# Patient Record
Sex: FEMALE | Race: BLACK OR AFRICAN AMERICAN | NOT HISPANIC OR LATINO
[De-identification: names, ages, dates, MRNs, and addresses within clinical notes are randomized per-mention and may not be internally consistent; named-entity substitution may affect disease eponyms.]

---

## 2017-01-30 ENCOUNTER — LABORATORY RESULT (OUTPATIENT)
Age: 32
End: 2017-01-30

## 2017-01-30 ENCOUNTER — APPOINTMENT (OUTPATIENT)
Dept: ENDOCRINOLOGY | Facility: CLINIC | Age: 32
End: 2017-01-30

## 2017-01-30 VITALS
WEIGHT: 195 LBS | HEIGHT: 66 IN | HEART RATE: 75 BPM | SYSTOLIC BLOOD PRESSURE: 122 MMHG | BODY MASS INDEX: 31.34 KG/M2 | OXYGEN SATURATION: 98 % | DIASTOLIC BLOOD PRESSURE: 70 MMHG

## 2017-01-31 LAB
25(OH)D3 SERPL-MCNC: 27.6 NG/ML
ALBUMIN SERPL ELPH-MCNC: 4.1 G/DL
ALP BLD-CCNC: 70 U/L
ALT SERPL-CCNC: 13 U/L
ANION GAP SERPL CALC-SCNC: 13 MMOL/L
AST SERPL-CCNC: 21 U/L
BILIRUB SERPL-MCNC: 0.2 MG/DL
BUN SERPL-MCNC: 10 MG/DL
CALCIUM SERPL-MCNC: 9.1 MG/DL
CHLORIDE SERPL-SCNC: 102 MMOL/L
CO2 SERPL-SCNC: 25 MMOL/L
CREAT SERPL-MCNC: 0.86 MG/DL
GLUCOSE SERPL-MCNC: 112 MG/DL
POTASSIUM SERPL-SCNC: 4.2 MMOL/L
PROT SERPL-MCNC: 6.5 G/DL
SODIUM SERPL-SCNC: 140 MMOL/L
T3RU NFR SERPL: 0.98 INDEX
T4 SERPL-MCNC: 4.4 UG/DL
TSH SERPL-ACNC: 12.32 UIU/ML

## 2017-08-01 ENCOUNTER — LABORATORY RESULT (OUTPATIENT)
Age: 32
End: 2017-08-01

## 2017-08-01 ENCOUNTER — APPOINTMENT (OUTPATIENT)
Dept: ENDOCRINOLOGY | Facility: CLINIC | Age: 32
End: 2017-08-01
Payer: COMMERCIAL

## 2017-08-01 VITALS
DIASTOLIC BLOOD PRESSURE: 70 MMHG | HEART RATE: 96 BPM | WEIGHT: 195 LBS | SYSTOLIC BLOOD PRESSURE: 120 MMHG | BODY MASS INDEX: 31.34 KG/M2 | OXYGEN SATURATION: 98 % | HEIGHT: 66 IN

## 2017-08-01 PROCEDURE — 99214 OFFICE O/P EST MOD 30 MIN: CPT

## 2017-08-02 LAB
25(OH)D3 SERPL-MCNC: 17.3 NG/ML
ALBUMIN SERPL ELPH-MCNC: 4.1 G/DL
ALP BLD-CCNC: 65 U/L
ALT SERPL-CCNC: 9 U/L
ANION GAP SERPL CALC-SCNC: 16 MMOL/L
AST SERPL-CCNC: 13 U/L
BILIRUB SERPL-MCNC: 0.3 MG/DL
BUN SERPL-MCNC: 14 MG/DL
CALCIUM SERPL-MCNC: 9.3 MG/DL
CALCIUM SERPL-MCNC: 9.3 MG/DL
CHLORIDE SERPL-SCNC: 105 MMOL/L
CO2 SERPL-SCNC: 22 MMOL/L
CREAT SERPL-MCNC: 0.74 MG/DL
GLUCOSE SERPL-MCNC: 88 MG/DL
PARATHYROID HORMONE INTACT: 39 PG/ML
POTASSIUM SERPL-SCNC: 3.9 MMOL/L
PROT SERPL-MCNC: 7 G/DL
SODIUM SERPL-SCNC: 143 MMOL/L
T3RU NFR SERPL: 0.86 INDEX
T4 SERPL-MCNC: 6.1 UG/DL
TSH SERPL-ACNC: 13.61 UIU/ML

## 2018-01-11 ENCOUNTER — APPOINTMENT (OUTPATIENT)
Dept: ENDOCRINOLOGY | Facility: CLINIC | Age: 33
End: 2018-01-11

## 2018-05-09 ENCOUNTER — OUTPATIENT (OUTPATIENT)
Dept: OUTPATIENT SERVICES | Facility: HOSPITAL | Age: 33
LOS: 1 days | Discharge: STILL ADMITTED OR ADMINISTRATIVE | End: 2018-05-09

## 2018-05-10 DIAGNOSIS — E03.9 HYPOTHYROIDISM, UNSPECIFIED: ICD-10-CM

## 2018-05-10 DIAGNOSIS — F41.9 ANXIETY DISORDER, UNSPECIFIED: ICD-10-CM

## 2018-05-10 DIAGNOSIS — F32.9 MAJOR DEPRESSIVE DISORDER, SINGLE EPISODE, UNSPECIFIED: ICD-10-CM

## 2018-05-31 ENCOUNTER — APPOINTMENT (OUTPATIENT)
Dept: PSYCHIATRY | Facility: CLINIC | Age: 33
End: 2018-05-31
Payer: COMMERCIAL

## 2018-05-31 DIAGNOSIS — Z81.8 FAMILY HISTORY OF OTHER MENTAL AND BEHAVIORAL DISORDERS: ICD-10-CM

## 2018-05-31 PROCEDURE — 99205 OFFICE O/P NEW HI 60 MIN: CPT

## 2018-06-18 ENCOUNTER — RX RENEWAL (OUTPATIENT)
Age: 33
End: 2018-06-18

## 2018-06-21 ENCOUNTER — APPOINTMENT (OUTPATIENT)
Dept: PSYCHIATRY | Facility: CLINIC | Age: 33
End: 2018-06-21

## 2018-07-13 ENCOUNTER — APPOINTMENT (OUTPATIENT)
Dept: PSYCHIATRY | Facility: CLINIC | Age: 33
End: 2018-07-13
Payer: COMMERCIAL

## 2018-07-13 PROCEDURE — 99213 OFFICE O/P EST LOW 20 MIN: CPT

## 2018-10-05 ENCOUNTER — APPOINTMENT (OUTPATIENT)
Dept: PSYCHIATRY | Facility: CLINIC | Age: 33
End: 2018-10-05
Payer: COMMERCIAL

## 2018-10-05 PROCEDURE — 99213 OFFICE O/P EST LOW 20 MIN: CPT

## 2018-10-10 ENCOUNTER — RX RENEWAL (OUTPATIENT)
Age: 33
End: 2018-10-10

## 2018-11-15 ENCOUNTER — TRANSCRIPTION ENCOUNTER (OUTPATIENT)
Age: 33
End: 2018-11-15

## 2019-01-04 ENCOUNTER — APPOINTMENT (OUTPATIENT)
Dept: PSYCHIATRY | Facility: CLINIC | Age: 34
End: 2019-01-04

## 2019-01-30 ENCOUNTER — APPOINTMENT (OUTPATIENT)
Dept: PSYCHIATRY | Facility: CLINIC | Age: 34
End: 2019-01-30
Payer: COMMERCIAL

## 2019-01-30 PROCEDURE — 99213 OFFICE O/P EST LOW 20 MIN: CPT

## 2019-02-21 ENCOUNTER — LABORATORY RESULT (OUTPATIENT)
Age: 34
End: 2019-02-21

## 2019-02-21 ENCOUNTER — APPOINTMENT (OUTPATIENT)
Dept: ENDOCRINOLOGY | Facility: CLINIC | Age: 34
End: 2019-02-21
Payer: COMMERCIAL

## 2019-02-21 VITALS
SYSTOLIC BLOOD PRESSURE: 120 MMHG | BODY MASS INDEX: 32.62 KG/M2 | HEIGHT: 66 IN | DIASTOLIC BLOOD PRESSURE: 80 MMHG | HEART RATE: 100 BPM | WEIGHT: 203 LBS | OXYGEN SATURATION: 98 %

## 2019-02-21 PROCEDURE — 99214 OFFICE O/P EST MOD 30 MIN: CPT

## 2019-02-21 RX ORDER — AMOXICILLIN 500 MG/1
500 CAPSULE ORAL
Qty: 28 | Refills: 0 | Status: DISCONTINUED | COMMUNITY
Start: 2017-11-17 | End: 2019-02-21

## 2019-02-21 RX ORDER — ESCITALOPRAM OXALATE 20 MG/1
20 TABLET ORAL
Qty: 90 | Refills: 0 | Status: DISCONTINUED | COMMUNITY
Start: 2018-05-31 | End: 2019-02-21

## 2019-02-21 RX ORDER — MULTIVITAMIN
TABLET ORAL
Refills: 0 | Status: ACTIVE | COMMUNITY

## 2019-02-21 RX ORDER — ESCITALOPRAM OXALATE 5 MG/1
5 TABLET ORAL DAILY
Qty: 30 | Refills: 2 | Status: DISCONTINUED | COMMUNITY
Start: 2018-05-31 | End: 2019-02-21

## 2019-02-21 RX ORDER — FLUTICASONE PROPIONATE 50 UG/1
50 SPRAY, METERED NASAL
Qty: 16 | Refills: 0 | Status: DISCONTINUED | COMMUNITY
Start: 2017-12-04 | End: 2019-02-21

## 2019-02-21 RX ORDER — BROMPHENIRAMINE MALEATE, PSEUDOEPHEDRINE HYDROCHLORIDE, 2; 30; 10 MG/5ML; MG/5ML; MG/5ML
30-2-10 SYRUP ORAL
Qty: 300 | Refills: 0 | Status: DISCONTINUED | COMMUNITY
Start: 2017-12-04 | End: 2019-02-21

## 2019-02-21 RX ORDER — PREDNISONE 20 MG/1
20 TABLET ORAL
Qty: 6 | Refills: 0 | Status: DISCONTINUED | COMMUNITY
Start: 2017-12-04 | End: 2019-02-21

## 2019-02-21 RX ORDER — CEFDINIR 300 MG/1
300 CAPSULE ORAL
Qty: 20 | Refills: 0 | Status: DISCONTINUED | COMMUNITY
Start: 2017-11-30 | End: 2019-02-21

## 2019-02-22 LAB
ALBUMIN SERPL ELPH-MCNC: 4 G/DL
ALP BLD-CCNC: 62 U/L
ALT SERPL-CCNC: 11 U/L
ANION GAP SERPL CALC-SCNC: 12 MMOL/L
AST SERPL-CCNC: 22 U/L
BILIRUB SERPL-MCNC: <0.2 MG/DL
BUN SERPL-MCNC: 11 MG/DL
CALCIUM SERPL-MCNC: 9.6 MG/DL
CHLORIDE SERPL-SCNC: 104 MMOL/L
CO2 SERPL-SCNC: 25 MMOL/L
CREAT SERPL-MCNC: 0.76 MG/DL
GLUCOSE SERPL-MCNC: 97 MG/DL
POTASSIUM SERPL-SCNC: 4.2 MMOL/L
PROT SERPL-MCNC: 6.6 G/DL
SODIUM SERPL-SCNC: 141 MMOL/L
T3RU NFR SERPL: 0.8 TBI
T4 SERPL-MCNC: 7.8 UG/DL
TSH SERPL-ACNC: 0.21 UIU/ML

## 2019-02-22 NOTE — END OF VISIT
[FreeTextEntry3] : I, Brandee Garcia, authored this note working as a medical scribe for Dr. Crawford.  02/21/2019.  2:15PM. \par This note was authored by Brandee Garcia working as medical scribe for me. I have reviewed, edited, and revised the note as needed. I am in agreement with the exam findings, imaging findings, and treatment plan.  Rk Crawford MD

## 2019-02-22 NOTE — PROCEDURE
[FreeTextEntry1] : Thyroid ultrasound 5/31/16\par Left  13 x 10 x 15 mm lower pole nodule \par Left 8 mm mid/isthmus nodule

## 2019-02-22 NOTE — ASSESSMENT
[FreeTextEntry1] : f/u 33 year-old female with \par \par 1. Post- ablative hypothyroidism: on increased LT4 175  mcg. No sx of hyper or hypothyroidism other than non-specific fatigue. Pt states she would occ take extra LT4 to try to combat the fatigue and was advised to stop changing doses herself. Pt is more recently exercising with a . Pt is also on anxiety medication. Chemically euthyroid. Previous goiter has resolved. No local neck pain or dysphagia. \par  \par 2. H/o low vitamin D: which is common post gastric bypass surgery. Vitamin D 17 although PTH not elevated, was previously on Vit D 50k/wk. \par \par Of note, pt having irregular menses. She states it comes every month but sometimes is 5 days long and other times 2 days long. \par \par I request labs sent out today. I may suggest adding a half a pill once a week. Will discuss after seeing thyroid levels. Discussed risks of hyperthyroidism to include palpations and osteoporosis. f/u in 3 mons.

## 2019-02-22 NOTE — PHYSICAL EXAM
[Alert] : alert [No Acute Distress] : no acute distress [Well Nourished] : well nourished [Well Developed] : well developed [Normal Sclera/Conjunctiva] : normal sclera/conjunctiva [EOMI] : extra ocular movement intact [No Proptosis] : no proptosis [Normal Oropharynx] : the oropharynx was normal [No Respiratory Distress] : no respiratory distress [No Accessory Muscle Use] : no accessory muscle use [Clear to Auscultation] : lungs were clear to auscultation bilaterally [Normal Rate] : heart rate was normal  [Normal S1, S2] : normal S1 and S2 [Regular Rhythm] : with a regular rhythm [No Edema] : there was no peripheral edema [Normal Bowel Sounds] : normal bowel sounds [Not Tender] : non-tender [Soft] : abdomen soft [Not Distended] : not distended [Post Cervical Nodes] : posterior cervical nodes [Anterior Cervical Nodes] : anterior cervical nodes [Normal] : normal and non tender [No Spinal Tenderness] : no spinal tenderness [Spine Straight] : spine straight [No Stigmata of Cushings Syndrome] : no stigmata of cushings syndrome [Normal Gait] : normal gait [Normal Strength/Tone] : muscle strength and tone were normal [No Rash] : no rash [Normal Reflexes] : deep tendon reflexes were 2+ and symmetric [No Tremors] : no tremors [Oriented x3] : oriented to person, place, and time [Acanthosis Nigricans] : no acanthosis nigricans [de-identified] : no significant goiter

## 2019-02-22 NOTE — HISTORY OF PRESENT ILLNESS
[FreeTextEntry1] : f/u 33 year-old female with post-ablative hypothyroidism. \par \par Pt had been told of an elevated T4 in a previous pregnancy. Repeat numbers were normal. Repeat blood test December 2015 TSH suppressed, free T4-1 0.9. Pt noticed an increase in heart rate but no other sx of hyperthyroidism. She has no local neck pain. She has had no thyroid  eye disease. There is no fhx of thyroid disease. Pt was never exposed to RT, lithium, or amiodarone. H/o GALAN for hyperthyroidism. Initial w/u showed 35% uptake with a hyperfunctional L nodule. Pt was treated with radioactive iodine, 28 mci for 35% uptake, heterogenous, dominant L nodule.  Began LT4 100 mcg increased to 175 for TSH 12. No current sx of hypothyroidism or hyperthyroidism. No change in sx on  increased dose. \par \par Medical hx is notable for morbid obesity treated with a gastric sleeve surgery January 2014. She has approximately 90 pound weight loss since then. Of note a previous vitamin D was low at 16 which is common after bariatric surgery. On Vit D 50k/wk for Vit D 17.

## 2019-04-05 ENCOUNTER — RX RENEWAL (OUTPATIENT)
Age: 34
End: 2019-04-05

## 2019-04-12 ENCOUNTER — APPOINTMENT (OUTPATIENT)
Dept: PSYCHIATRY | Facility: CLINIC | Age: 34
End: 2019-04-12

## 2019-06-06 ENCOUNTER — APPOINTMENT (OUTPATIENT)
Dept: ENDOCRINOLOGY | Facility: CLINIC | Age: 34
End: 2019-06-06
Payer: COMMERCIAL

## 2019-06-06 VITALS
HEIGHT: 66 IN | OXYGEN SATURATION: 98 % | DIASTOLIC BLOOD PRESSURE: 76 MMHG | HEART RATE: 86 BPM | WEIGHT: 207 LBS | SYSTOLIC BLOOD PRESSURE: 120 MMHG | BODY MASS INDEX: 33.27 KG/M2

## 2019-06-06 PROCEDURE — 99214 OFFICE O/P EST MOD 30 MIN: CPT

## 2019-06-06 RX ORDER — METRONIDAZOLE 7.5 MG/G
0.75 GEL VAGINAL
Qty: 70 | Refills: 0 | Status: COMPLETED | COMMUNITY
Start: 2019-04-04

## 2019-06-06 NOTE — PAST MEDICAL HISTORY
[Excessive Bleeding] : there was excessive bleeding [Less Bleeding] : the period was lighter than normal [Regular Cycle Intervals] : have been regular

## 2019-06-07 NOTE — END OF VISIT
[FreeTextEntry3] : I, Brandee Garcia, authored this note working as a medical scribe for Dr. Crawford.  06/06/2019.  3:30PM. This note was authored by Brandee Garcia working as medical scribe for me. I have reviewed, edited, and revised the note as needed. I am in agreement with the exam findings, imaging findings, and treatment plan.  Rk Crawford MD \par

## 2019-06-07 NOTE — ASSESSMENT
[FreeTextEntry1] : f/u 33 year-old female with \par \par 1. Post- ablative hypothyroidism: on increased LT4 175  mcg. No sx of hyper or hypothyroidism other than non-specific fatigue. Pt has not been properly taking medicine; skipping once a day per week and taking with iron. This is reflected in routine labs with have elevated TSH, and in sx of fatigue. Increased risk of malabsorption of thyroid due to previous gastric bypass surgery. Advised to take T4 regularly on empty stomach. If dosage is missed, take two pills the next day. Pt is more recently exercising with a . Advised that weight loss might be more difficult if she is not on correct dosage of T4. Advised to take medicine by itself for 6 weeks on empty stomach.\par \par 2. H/o low vitamin D: which is common post gastric bypass surgery. Vitamin D 17 although PTH not elevated, was previously on Vit D 50k/wk. \par \par Get bloodwork done in 6 weeks, and f/u in 4 months.  [Levothyroxine] : The patient was instructed to take Levothyroxine on an empty stomach, separate from vitamins, and wait at least 30 minutes before eating

## 2019-06-07 NOTE — HISTORY OF PRESENT ILLNESS
[FreeTextEntry1] : f/u 33 year-old female with post-ablative hypothyroidism. \par \par Pt had been told of an elevated T4 in a previous pregnancy. Repeat numbers were normal. Repeat blood test December 2015 TSH suppressed, free T4-1 0.9. Pt noticed an increase in heart rate but no other sx of hyperthyroidism. She has no local neck pain. She has had no thyroid  eye disease. No fhx of thyroid disease. Pt was never exposed to RT, lithium, or amiodarone. H/o GALAN for hyperthyroidism. Initial w/u showed 35% uptake with a hyperfunctional L nodule. Pt was treated with GALAN, 28 mci for 35% uptake, heterogenous, dominant L nodule. Began LT4 100 mcg increased to 175 for TSH 12. Bloodwork done 5/7/2019. T4: 1.0 (normal 0.8 to 1.8). TSH 13.9.  Works at night, forgets meds once a week, but has been taking it with a multivitamin that contains iron. Pt always tired. Varied menstrual flow recently.\par \par Medical hx is notable for morbid obesity treated with a gastric sleeve surgery January 2014. She has approximately 90 pound weight loss since then. Pt states that it is difficult to lose weight. Of note a previous vitamin D was low at 16 which is common after bariatric surgery. On Vit D 50k/wk for Vit D 17.

## 2019-06-07 NOTE — PHYSICAL EXAM
[Well Nourished] : well nourished [Alert] : alert [No Acute Distress] : no acute distress [EOMI] : extra ocular movement intact [Well Developed] : well developed [Normal Sclera/Conjunctiva] : normal sclera/conjunctiva [Normal Oropharynx] : the oropharynx was normal [No Respiratory Distress] : no respiratory distress [No Proptosis] : no proptosis [No Accessory Muscle Use] : no accessory muscle use [Normal Rate] : heart rate was normal  [Clear to Auscultation] : lungs were clear to auscultation bilaterally [Normal S1, S2] : normal S1 and S2 [Regular Rhythm] : with a regular rhythm [Normal Bowel Sounds] : normal bowel sounds [No Edema] : there was no peripheral edema [Not Tender] : non-tender [Soft] : abdomen soft [Not Distended] : not distended [Post Cervical Nodes] : posterior cervical nodes [Normal] : normal and non tender [Anterior Cervical Nodes] : anterior cervical nodes [No Spinal Tenderness] : no spinal tenderness [Spine Straight] : spine straight [No Stigmata of Cushings Syndrome] : no stigmata of cushings syndrome [Normal Gait] : normal gait [Normal Strength/Tone] : muscle strength and tone were normal [No Rash] : no rash [Normal Reflexes] : deep tendon reflexes were 2+ and symmetric [Oriented x3] : oriented to person, place, and time [No Tremors] : no tremors [Acanthosis Nigricans] : no acanthosis nigricans [de-identified] : no significant goiter

## 2019-06-11 ENCOUNTER — APPOINTMENT (OUTPATIENT)
Dept: PSYCHIATRY | Facility: CLINIC | Age: 34
End: 2019-06-11
Payer: COMMERCIAL

## 2019-06-11 PROCEDURE — 99214 OFFICE O/P EST MOD 30 MIN: CPT

## 2019-09-06 ENCOUNTER — APPOINTMENT (OUTPATIENT)
Dept: PSYCHIATRY | Facility: CLINIC | Age: 34
End: 2019-09-06
Payer: COMMERCIAL

## 2019-09-06 PROCEDURE — 99214 OFFICE O/P EST MOD 30 MIN: CPT

## 2019-10-28 ENCOUNTER — APPOINTMENT (OUTPATIENT)
Dept: ENDOCRINOLOGY | Facility: CLINIC | Age: 34
End: 2019-10-28
Payer: COMMERCIAL

## 2019-10-28 VITALS
DIASTOLIC BLOOD PRESSURE: 72 MMHG | BODY MASS INDEX: 34.07 KG/M2 | HEIGHT: 66 IN | WEIGHT: 212 LBS | HEART RATE: 72 BPM | SYSTOLIC BLOOD PRESSURE: 102 MMHG | OXYGEN SATURATION: 98 %

## 2019-10-28 PROCEDURE — 99214 OFFICE O/P EST MOD 30 MIN: CPT

## 2019-10-28 RX ORDER — ESCITALOPRAM OXALATE 20 MG/1
20 TABLET ORAL DAILY
Qty: 30 | Refills: 0 | Status: DISCONTINUED | COMMUNITY
Start: 2019-01-30 | End: 2019-10-28

## 2019-10-28 NOTE — PHYSICAL EXAM
[Alert] : alert [No Acute Distress] : no acute distress [Well Nourished] : well nourished [Well Developed] : well developed [Normal Sclera/Conjunctiva] : normal sclera/conjunctiva [EOMI] : extra ocular movement intact [No Proptosis] : no proptosis [Normal Oropharynx] : the oropharynx was normal [No Respiratory Distress] : no respiratory distress [No Accessory Muscle Use] : no accessory muscle use [Clear to Auscultation] : lungs were clear to auscultation bilaterally [Normal Rate] : heart rate was normal  [Normal S1, S2] : normal S1 and S2 [Regular Rhythm] : with a regular rhythm [No Edema] : there was no peripheral edema [Normal Bowel Sounds] : normal bowel sounds [Not Tender] : non-tender [Soft] : abdomen soft [Not Distended] : not distended [Post Cervical Nodes] : posterior cervical nodes [Anterior Cervical Nodes] : anterior cervical nodes [Normal] : normal and non tender [No Spinal Tenderness] : no spinal tenderness [Spine Straight] : spine straight [No Stigmata of Cushings Syndrome] : no stigmata of cushings syndrome [Normal Gait] : normal gait [Normal Strength/Tone] : muscle strength and tone were normal [No Rash] : no rash [Normal Reflexes] : deep tendon reflexes were 2+ and symmetric [No Tremors] : no tremors [Oriented x3] : oriented to person, place, and time [Acanthosis Nigricans] : no acanthosis nigricans [de-identified] : no significant goiter

## 2019-10-28 NOTE — ASSESSMENT
[Levothyroxine] : The patient was instructed to take Levothyroxine on an empty stomach, separate from vitamins, and wait at least 30 minutes before eating [FreeTextEntry1] : F/u 33 year-old female with \par \par 1. Post-ablative hypothyroidism: on increased LT4 175 mcg. No sx of hyper or hypothyroidism other than non-specific fatigue. Pt is at increased risk of malabsorption of thyroid due to previous gastric bypass surgery. Previously advised to take T4 regularly on empty stomach. Pt has been exercising with a  but stopped recently. Pt c/o ~ 40 lbs weight gain. Thyroid US 10/2019 show a single left stable nodule with decreased in size vs 2016. Observe.\par \par 2. H/o low vitamin D: which is common post gastric bypass surgery. Vitamin D 17 although PTH not elevated, was previously on Vit D 50k/wk. \par Await lab results from primary MD\par F/u in 6 months

## 2019-10-28 NOTE — HISTORY OF PRESENT ILLNESS
[FreeTextEntry1] : F/u 33 year-old female with post-ablative hypothyroidism. \par \par Pt had been told of an elevated T4 in a previous pregnancy. Repeat labs normal. Labs 12/2015 TSH suppressed, free T4-1 0.9. Pt noticed an increase in heart rate but no other sx of hyperthyroidism. She has no local neck pain. She has had no thyroid  eye disease. No Fhx of thyroid disease. Pt was never exposed to RT, lithium, or amiodarone. H/o GALAN for hyperthyroidism. Initial w/u showed 35% uptake with a hyperfunctional L nodule. Pt was treated with GALAN, 28 mci for 35% uptake, heterogenous, dominant L nodule. Began LT4 100 mcg increased to 175 for TSH 12. Labs show T4: 1.0 (normal 0.8 to 1.8). TSH 13.9. Pt works at night, forgets meds once a week, but has been taking it with a multivitamin that contains iron. Pt always tired.\par \par Medical hx is notable for morbid obesity treated with a gastric sleeve surgery January 2014. Pt states that it is difficult to lose weight. Pt c/o gaining 40 lbs recently. Pt has been exercising with  but recently stopped.\par \par Of note, previous vit D was low at 16 which is common after bariatric surgery. On Vit D 50k/wk for Vit D 17.

## 2019-10-28 NOTE — PROCEDURE
[FreeTextEntry1] : Thyroid US - 10/28/2019\par Single Left 7 x 7x 9 mm nodule\par \par Thyroid ultrasound 5/31/16\par Left  13 x 10 x 15 mm lower pole nodule \par Left 8 mm mid/isthmus nodule

## 2019-10-28 NOTE — END OF VISIT
[FreeTextEntry3] : I, Noe Roche, authored this note working as a medical scribe for Dr. Crawford.  10/28/2019. 11:45AM. This note was authored by the medical scribe for me. I have reviewed, edited, and revised the note as needed. I am in agreement with the exam findings, imaging findings, and treatment plan.  Rk Crawford MD

## 2019-12-04 ENCOUNTER — APPOINTMENT (OUTPATIENT)
Dept: PSYCHIATRY | Facility: CLINIC | Age: 34
End: 2019-12-04
Payer: COMMERCIAL

## 2019-12-04 PROCEDURE — 99213 OFFICE O/P EST LOW 20 MIN: CPT

## 2020-01-07 ENCOUNTER — APPOINTMENT (OUTPATIENT)
Dept: PSYCHIATRY | Facility: CLINIC | Age: 35
End: 2020-01-07
Payer: COMMERCIAL

## 2020-01-07 PROCEDURE — 99213 OFFICE O/P EST LOW 20 MIN: CPT

## 2020-01-21 ENCOUNTER — RX RENEWAL (OUTPATIENT)
Age: 35
End: 2020-01-21

## 2020-02-04 ENCOUNTER — APPOINTMENT (OUTPATIENT)
Dept: PEDIATRIC ALLERGY IMMUNOLOGY | Facility: CLINIC | Age: 35
End: 2020-02-04

## 2020-04-07 ENCOUNTER — APPOINTMENT (OUTPATIENT)
Dept: PSYCHIATRY | Facility: CLINIC | Age: 35
End: 2020-04-07
Payer: COMMERCIAL

## 2020-04-07 PROCEDURE — 99214 OFFICE O/P EST MOD 30 MIN: CPT

## 2020-07-07 ENCOUNTER — APPOINTMENT (OUTPATIENT)
Dept: PSYCHIATRY | Facility: CLINIC | Age: 35
End: 2020-07-07
Payer: COMMERCIAL

## 2020-07-07 PROCEDURE — 99213 OFFICE O/P EST LOW 20 MIN: CPT

## 2020-07-07 RX ORDER — ESCITALOPRAM OXALATE 10 MG/1
10 TABLET ORAL
Qty: 90 | Refills: 0 | Status: DISCONTINUED | COMMUNITY
Start: 2019-06-11 | End: 2020-07-07

## 2020-07-28 ENCOUNTER — APPOINTMENT (OUTPATIENT)
Dept: ENDOCRINOLOGY | Facility: CLINIC | Age: 35
End: 2020-07-28

## 2020-07-30 ENCOUNTER — APPOINTMENT (OUTPATIENT)
Dept: PSYCHIATRY | Facility: CLINIC | Age: 35
End: 2020-07-30
Payer: COMMERCIAL

## 2020-07-30 PROCEDURE — 99214 OFFICE O/P EST MOD 30 MIN: CPT | Mod: 95

## 2021-01-07 ENCOUNTER — APPOINTMENT (OUTPATIENT)
Dept: PSYCHIATRY | Facility: CLINIC | Age: 36
End: 2021-01-07
Payer: COMMERCIAL

## 2021-01-07 PROCEDURE — 99214 OFFICE O/P EST MOD 30 MIN: CPT | Mod: 95

## 2021-01-07 RX ORDER — BUPROPION HYDROCHLORIDE 75 MG/1
75 TABLET, FILM COATED ORAL
Qty: 7 | Refills: 0 | Status: DISCONTINUED | COMMUNITY
Start: 2020-07-07 | End: 2021-01-07

## 2021-01-07 RX ORDER — BUPROPION HYDROCHLORIDE 150 MG/1
150 TABLET, EXTENDED RELEASE ORAL
Qty: 90 | Refills: 0 | Status: DISCONTINUED | COMMUNITY
Start: 2020-07-07 | End: 2021-01-07

## 2021-01-14 ENCOUNTER — LABORATORY RESULT (OUTPATIENT)
Age: 36
End: 2021-01-14

## 2021-01-14 ENCOUNTER — APPOINTMENT (OUTPATIENT)
Dept: ENDOCRINOLOGY | Facility: CLINIC | Age: 36
End: 2021-01-14
Payer: COMMERCIAL

## 2021-01-14 VITALS
HEART RATE: 101 BPM | DIASTOLIC BLOOD PRESSURE: 80 MMHG | TEMPERATURE: 98.1 F | OXYGEN SATURATION: 98 % | WEIGHT: 210 LBS | HEIGHT: 66 IN | SYSTOLIC BLOOD PRESSURE: 148 MMHG | BODY MASS INDEX: 33.75 KG/M2

## 2021-01-14 DIAGNOSIS — E55.9 VITAMIN D DEFICIENCY, UNSPECIFIED: ICD-10-CM

## 2021-01-14 PROCEDURE — 99214 OFFICE O/P EST MOD 30 MIN: CPT | Mod: 25

## 2021-01-14 PROCEDURE — 99072 ADDL SUPL MATRL&STAF TM PHE: CPT

## 2021-01-15 ENCOUNTER — TRANSCRIPTION ENCOUNTER (OUTPATIENT)
Age: 36
End: 2021-01-15

## 2021-01-15 LAB
25(OH)D3 SERPL-MCNC: 19.8 NG/ML
ALBUMIN SERPL ELPH-MCNC: 4.1 G/DL
ALP BLD-CCNC: 80 U/L
ALT SERPL-CCNC: 16 U/L
ANION GAP SERPL CALC-SCNC: 12 MMOL/L
AST SERPL-CCNC: 20 U/L
BASOPHILS # BLD AUTO: 0.02 K/UL
BASOPHILS NFR BLD AUTO: 0.3 %
BILIRUB SERPL-MCNC: <0.2 MG/DL
BUN SERPL-MCNC: 11 MG/DL
CALCIUM SERPL-MCNC: 9 MG/DL
CHLORIDE SERPL-SCNC: 103 MMOL/L
CO2 SERPL-SCNC: 25 MMOL/L
CREAT SERPL-MCNC: 0.93 MG/DL
EOSINOPHIL # BLD AUTO: 0.04 K/UL
EOSINOPHIL NFR BLD AUTO: 0.7 %
GLUCOSE SERPL-MCNC: 89 MG/DL
HCT VFR BLD CALC: 38.5 %
HGB BLD-MCNC: 12.2 G/DL
IMM GRANULOCYTES NFR BLD AUTO: 0.3 %
LYMPHOCYTES # BLD AUTO: 1.24 K/UL
LYMPHOCYTES NFR BLD AUTO: 20.3 %
MAN DIFF?: NORMAL
MCHC RBC-ENTMCNC: 28.2 PG
MCHC RBC-ENTMCNC: 31.7 GM/DL
MCV RBC AUTO: 89.1 FL
MONOCYTES # BLD AUTO: 0.44 K/UL
MONOCYTES NFR BLD AUTO: 7.2 %
NEUTROPHILS # BLD AUTO: 4.35 K/UL
NEUTROPHILS NFR BLD AUTO: 71.2 %
PLATELET # BLD AUTO: 403 K/UL
POTASSIUM SERPL-SCNC: 3.9 MMOL/L
PROT SERPL-MCNC: 7.1 G/DL
RBC # BLD: 4.32 M/UL
RBC # FLD: 13.7 %
SODIUM SERPL-SCNC: 140 MMOL/L
T3RU NFR SERPL: 0.7 TBI
T4 SERPL-MCNC: 7.8 UG/DL
TSH SERPL-ACNC: 1.55 UIU/ML
WBC # FLD AUTO: 6.11 K/UL

## 2021-01-15 NOTE — PROCEDURE
[FreeTextEntry1] : Thyroid US - 1/13/21\par Single Left 7 x 6 x 10 mm nodule\par \par Thyroid US - 10/28/2019\par Single Left 7 x 7x 9 mm nodule\par \par Thyroid ultrasound 5/31/16\par Left  13 x 10 x 15 mm lower pole nodule \par Left 8 mm mid/isthmus nodule

## 2021-01-15 NOTE — ASSESSMENT
[Levothyroxine] : The patient was instructed to take Levothyroxine on an empty stomach, separate from vitamins, and wait at least 30 minutes before eating [FreeTextEntry1] : F/u 35 year-old female with \par \par 1. Post-ablative hypothyroidism: on increased LT4 175 mcg.  Clinically euthyroid except the symptoms of tremor.  Check thyroid function tests adjust as needed.\par History of a small thyroid nodule.  Current ultrasound shows a subcentimeter left thyroid nodule which is stable.  Observe.\par  \par \par 2. H/o low vitamin D: which is common post gastric bypass surgery. Vitamin D 17 although PTH not elevated, was previously on Vit D 50k/wk. \par \par F/u in 6 months

## 2021-01-15 NOTE — HISTORY OF PRESENT ILLNESS
[FreeTextEntry1] : late F/u   post-ablative hypothyroidism. \par c/o shakes\par \par Pt had been told of an elevated T4 in a previous pregnancy. Repeat labs normal. Labs 12/2015 TSH suppressed, free T4-1 0.9. Pt noticed an increase in heart rate but no other sx of hyperthyroidism. She has no local neck pain. She has had no thyroid  eye disease. No Fhx of thyroid disease. Pt was never exposed to RT, lithium, or amiodarone. H/o GALAN for hyperthyroidism. Initial w/u showed 35% uptake with a hyperfunctional L nodule. Pt was treated with GALAN, 28 mci for 35% uptake, heterogenous, dominant L nodule. Began LT4 100 mcg increased to 175 for TSH 12\par  Pt works at night, .  \par \par Medical hx is notable for morbid obesity treated with a gastric sleeve surgery January 2014. Pt states that it is difficult to lose weight. Pt c/o gaining 40 lbs recently. Pt has been exercising with  but recently stopped.\par \par Of note, previous vit D was low at 16 which is common after bariatric surgery. On Vit D 50k/wk for Vit D 17.

## 2021-01-15 NOTE — PHYSICAL EXAM
[Alert] : alert [Well Nourished] : well nourished [No Acute Distress] : no acute distress [Well Developed] : well developed [Normal Sclera/Conjunctiva] : normal sclera/conjunctiva [EOMI] : extra ocular movement intact [No Proptosis] : no proptosis [Normal Oropharynx] : the oropharynx was normal [Thyroid Not Enlarged] : the thyroid was not enlarged [No Thyroid Nodules] : no palpable thyroid nodules [Clear to Auscultation] : lungs were clear to auscultation bilaterally [Normal S1, S2] : normal S1 and S2 [Normal Rate] : heart rate was normal [Regular Rhythm] : with a regular rhythm [No Edema] : no peripheral edema [Normal Bowel Sounds] : normal bowel sounds [Not Tender] : non-tender [Not Distended] : not distended [Normal Anterior Cervical Nodes] : no anterior cervical lymphadenopathy [Soft] : abdomen soft [Normal Posterior Cervical Nodes] : no posterior cervical lymphadenopathy [No Spinal Tenderness] : no spinal tenderness [Spine Straight] : spine straight [No Stigmata of Cushings Syndrome] : no stigmata of Cushings Syndrome [Normal Gait] : normal gait [No Rash] : no rash [Normal Reflexes] : deep tendon reflexes were 2+ and symmetric [Oriented x3] : oriented to person, place, and time [Acanthosis Nigricans] : no acanthosis nigricans [de-identified] : Resting fine tremor

## 2021-01-19 ENCOUNTER — TRANSCRIPTION ENCOUNTER (OUTPATIENT)
Age: 36
End: 2021-01-19

## 2021-02-02 ENCOUNTER — RX RENEWAL (OUTPATIENT)
Age: 36
End: 2021-02-02

## 2021-04-20 ENCOUNTER — APPOINTMENT (OUTPATIENT)
Dept: PSYCHIATRY | Facility: CLINIC | Age: 36
End: 2021-04-20
Payer: COMMERCIAL

## 2021-04-20 ENCOUNTER — APPOINTMENT (OUTPATIENT)
Dept: ENDOCRINOLOGY | Facility: CLINIC | Age: 36
End: 2021-04-20

## 2021-04-20 PROCEDURE — 99214 OFFICE O/P EST MOD 30 MIN: CPT | Mod: 95

## 2021-04-20 RX ORDER — HYDROXYZINE HYDROCHLORIDE 25 MG/1
25 TABLET ORAL 4 TIMES DAILY
Qty: 120 | Refills: 1 | Status: DISCONTINUED | COMMUNITY
Start: 2021-01-07 | End: 2021-04-20

## 2021-08-05 ENCOUNTER — APPOINTMENT (OUTPATIENT)
Dept: PSYCHIATRY | Facility: CLINIC | Age: 36
End: 2021-08-05
Payer: COMMERCIAL

## 2021-08-05 PROCEDURE — 99214 OFFICE O/P EST MOD 30 MIN: CPT | Mod: 95

## 2021-11-02 ENCOUNTER — APPOINTMENT (OUTPATIENT)
Dept: PSYCHIATRY | Facility: CLINIC | Age: 36
End: 2021-11-02
Payer: COMMERCIAL

## 2021-11-02 DIAGNOSIS — F41.8 OTHER SPECIFIED ANXIETY DISORDERS: ICD-10-CM

## 2021-11-02 PROCEDURE — 99214 OFFICE O/P EST MOD 30 MIN: CPT | Mod: 95

## 2022-03-29 ENCOUNTER — APPOINTMENT (OUTPATIENT)
Dept: PSYCHIATRY | Facility: CLINIC | Age: 37
End: 2022-03-29

## 2022-04-06 ENCOUNTER — APPOINTMENT (OUTPATIENT)
Dept: PSYCHIATRY | Facility: CLINIC | Age: 37
End: 2022-04-06

## 2023-01-09 ENCOUNTER — LABORATORY RESULT (OUTPATIENT)
Age: 38
End: 2023-01-09

## 2023-01-09 ENCOUNTER — APPOINTMENT (OUTPATIENT)
Dept: ENDOCRINOLOGY | Facility: CLINIC | Age: 38
End: 2023-01-09
Payer: COMMERCIAL

## 2023-01-09 VITALS
SYSTOLIC BLOOD PRESSURE: 118 MMHG | BODY MASS INDEX: 35.36 KG/M2 | HEART RATE: 81 BPM | OXYGEN SATURATION: 99 % | RESPIRATION RATE: 14 BRPM | WEIGHT: 220 LBS | HEIGHT: 66 IN | DIASTOLIC BLOOD PRESSURE: 68 MMHG

## 2023-01-09 PROCEDURE — 99214 OFFICE O/P EST MOD 30 MIN: CPT

## 2023-01-09 NOTE — PHYSICAL EXAM
[Alert] : alert [Well Nourished] : well nourished [No Acute Distress] : no acute distress [Well Developed] : well developed [Normal Sclera/Conjunctiva] : normal sclera/conjunctiva [EOMI] : extra ocular movement intact [No Proptosis] : no proptosis [Normal Oropharynx] : the oropharynx was normal [Thyroid Not Enlarged] : the thyroid was not enlarged [No Thyroid Nodules] : no palpable thyroid nodules [No Respiratory Distress] : no respiratory distress [Clear to Auscultation] : lungs were clear to auscultation bilaterally [Normal S1, S2] : normal S1 and S2 [Normal Rate] : heart rate was normal [Regular Rhythm] : with a regular rhythm [No Edema] : no peripheral edema [Normal Bowel Sounds] : normal bowel sounds [Not Tender] : non-tender [Not Distended] : not distended [Soft] : abdomen soft [Normal Anterior Cervical Nodes] : no anterior cervical lymphadenopathy [No Spinal Tenderness] : no spinal tenderness [Spine Straight] : spine straight [No Stigmata of Cushings Syndrome] : no stigmata of Cushings Syndrome [Normal Gait] : normal gait [No Rash] : no rash [Normal Reflexes] : deep tendon reflexes were 2+ and symmetric [Oriented x3] : oriented to person, place, and time [Acanthosis Nigricans] : no acanthosis nigricans [de-identified] : Resting fine tremor

## 2023-01-09 NOTE — HISTORY OF PRESENT ILLNESS
[FreeTextEntry1] : Patient returns for a follow up visit for   post-ablative hypothyroidism. Pt c/o fatigue.  Patient is also requesting information about medical management for obesity as well as referral for reproductive endocrinology.\par \par \par Pt had been told of an elevated T4 in a previous pregnancy. Repeat labs normal. Labs 12/2015 TSH suppressed, free T4-1 0.9. Pt noticed an increase in heart rate but no other sx of hyperthyroidism. She has no local neck pain. She has had no thyroid  eye disease. No Fhx of thyroid disease. Pt was never exposed to RT, lithium, or amiodarone. H/o GALAN for hyperthyroidism. Initial w/u showed 35% uptake with a hyperfunctional L nodule. Pt was treated with GALAN, 28 mci for 35% uptake, heterogenous, dominant L nodule. Pt is taking  LT4 175 mcg . \par    Recent TSH 4.8 but no dose adjustment was made.\par \par Medical hx is notable for morbid obesity treated with a gastric sleeve surgery January 2014. Pt states that it is difficult to lose weight. Pt c/o gaining 40 lbs recently. Pt has been exercising with  but recently stopped.\par \par Of note, previous vit D was low at 16 which is common after bariatric surgery. On Vit D 50k/wk for Vit D 17.

## 2023-01-09 NOTE — END OF VISIT
[FreeTextEntry3] : This note was written by Lula Cárdenas on ( January 9, 2023 ) acting as a medical scribe for Dr. Crawford This note was authored by the medical scribe for me. I have reviewed, edited, and revised the note as needed. I am in agreement with the exam findings, imaging findings, and treatment plan.  Rk Crawford MD

## 2023-01-09 NOTE — PROCEDURE
[FreeTextEntry1] : Thyroid US - 1/9/2023\par Single Left hyperechoic 9 x 8 x 14 mm nodule\par \par Thyroid US - 1/13/21\par Single Left 7 x 6 x 10 mm nodule\par \par Thyroid US - 10/28/2019\par Single Left 7 x 7x 9 mm nodule\par \par Thyroid ultrasound 5/31/16\par Left  13 x 10 x 15 mm lower pole nodule \par Left 8 mm mid/isthmus nodule

## 2023-01-09 NOTE — ASSESSMENT
[Levothyroxine] : The patient was instructed to take Levothyroxine on an empty stomach, separate from vitamins, and wait at least 30 minutes before eating [FreeTextEntry1] : 37 year-old female with \par \par 1. Post-ablative hypothyroidism: on increased LT4 175 mcg.  Clinically euthyroid but complaining of fatigue.  Last TSH mildly elevated.  Check thyroid function tests adjust as needed.\par \par History of a small thyroid nodule.  Current ultrasound shows a subcentimeter left thyroid nodule which is stable.  Observe.\par   \par History of obesity despite previous bariatric surgery.  Options of therapy including GLP-1 therapy discussed in great detail.  Risks and benefits including GI symptoms as well as rare risk of pancreatitis reviewed.  Pt elects to try wegovy. Will let me know if it is covered by insurance. \par \par Labs August 2022\par TSH 4.82 \par \par F/u in 3 months\par Patient requests referral to reproductive endocrinology for potential egg freezing.  Referred to full-time faculty Dr. Mckay

## 2023-01-10 LAB
ALBUMIN SERPL ELPH-MCNC: 4.1 G/DL
ALP BLD-CCNC: 76 U/L
ALT SERPL-CCNC: 9 U/L
ANION GAP SERPL CALC-SCNC: 10 MMOL/L
AST SERPL-CCNC: 15 U/L
BILIRUB SERPL-MCNC: <0.2 MG/DL
BUN SERPL-MCNC: 14 MG/DL
CALCIUM SERPL-MCNC: 9.4 MG/DL
CHLORIDE SERPL-SCNC: 102 MMOL/L
CO2 SERPL-SCNC: 27 MMOL/L
CREAT SERPL-MCNC: 0.78 MG/DL
EGFR: 99 ML/MIN/1.73M2
GLUCOSE SERPL-MCNC: 78 MG/DL
POTASSIUM SERPL-SCNC: 4.8 MMOL/L
PROT SERPL-MCNC: 6.7 G/DL
SODIUM SERPL-SCNC: 139 MMOL/L
T3RU NFR SERPL: 0.9 TBI
T4 SERPL-MCNC: 4.2 UG/DL
TSH SERPL-ACNC: 12 UIU/ML

## 2023-02-06 ENCOUNTER — APPOINTMENT (OUTPATIENT)
Dept: PSYCHIATRY | Facility: CLINIC | Age: 38
End: 2023-02-06
Payer: COMMERCIAL

## 2023-02-06 DIAGNOSIS — F90.0 ATTENTION-DEFICIT HYPERACTIVITY DISORDER, PREDOMINANTLY INATTENTIVE TYPE: ICD-10-CM

## 2023-02-06 PROCEDURE — 99215 OFFICE O/P EST HI 40 MIN: CPT

## 2023-02-06 NOTE — SOCIAL HISTORY
[Alone] : lives alone [Employed] : employed [Never ] : never  [College] : College [None] : none [FreeTextEntry5] : Denies [FreeTextEntry1] : patient is the youngest of 5\par Mother is an LPN

## 2023-02-06 NOTE — DISCUSSION/SUMMARY
[FreeTextEntry1] : Assessment: Patient is a 36 yo AA female with h/o depression, anxiety, and ADHD  seen today for medication management. Patient is compliant with the medications, tolerating it well without any side effects. I-STOP was checked without any problems.\par \par \par Plan: \par Start Adderall 5 mg PO QD for ADHD\par Continue Lexapro to 10  mg QD for depression and anxiety.  \par - Discussed risks and benefits of medications including side effects of GI and sexual with SSRI. Alternative strategies including no intervention discussed with patient. Patient consents to current medications as prescribed.\par - Patient understands to contact clinic prn with concerns and agrees to call 911 or go to nearest ER if symptoms worsen.\par - Next appointment made in 1 month. Patient left the office without any distress.\par

## 2023-02-06 NOTE — CURRENT PSYCHIATRIC SYMPTOMS
[Depressed Mood] : no depressed mood [Insomnia] : no insomnia disorder [Euphoria] : no euphoria [Grandeur] : no feelings of grandeur [Delusions] : no ~T delusions [Excessive Worry] : no excessive worries [Hypochondriasis] : no hypochondriasis [Recent Onset] : no recent onset of confusion [Tremor] : ~T no ~M tremor was seen [Hallucination Tactile] : no tactile hallucinations [Yawning] : no yawning

## 2023-02-06 NOTE — HISTORY OF PRESENT ILLNESS
[de-identified] : Patient is a transfer from [facility] as she left the practice. Patient is currently on Lexapro 10 mg. It was decreased form 20 to 10 mg in Sept 2022. \par \par Patient states she is doing well on the current medication regimen. Patient states she came into see [facility] initially for depression and anxiety\par \par States she decreased the dose of the Lexapro form 20 to 10 mg because "20 mg was making me feel too happy. I wanted to feel my feelings and I feel 10 mg is perfect." \par \par States she wanted to be evaluated for ADHD. 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 States "I am unorganized, my mind is racing, not able to finish tasks. 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 Has test anxiety."\par \par Mood: less depression and anxiety with the help of the Lexapro. \par Sleep["        6-7 hours per night\par Appetite si too much\par Energy, concentration and motivation are all decreased. Denies any AVH, SI or HI. \par \par \par \par \par \par \par \par \par \par  [No] : no

## 2023-02-06 NOTE — PHYSICAL EXAM
[None] : none [Anxious] : no anxious [Appropriate] : appropriate [Irritable] : no irritable [Constricted] : not constricted [Normal] : good [FreeTextEntry8] : good

## 2023-02-06 NOTE — FAMILY HISTORY
[FreeTextEntry1] : Patient reports a maternal aunt had "a nervous breakdown"\par \par Father practices meditation

## 2023-02-06 NOTE — PAST MEDICAL HISTORY
[FreeTextEntry1] : Pt reports no previous medications. Pt reports no previous psychiatric \par admissions, no previous suicide attempts and no self-injury. Pt reports previous arrest in 2013 for \par driving with a suspended license. Pt reports in previous relationship throwing things when angry. [facility] reporting she was seeking evaluation for medications.

## 2023-03-03 ENCOUNTER — APPOINTMENT (OUTPATIENT)
Dept: PSYCHIATRY | Facility: CLINIC | Age: 38
End: 2023-03-03

## 2023-04-10 ENCOUNTER — APPOINTMENT (OUTPATIENT)
Dept: ENDOCRINOLOGY | Facility: CLINIC | Age: 38
End: 2023-04-10

## 2023-07-17 ENCOUNTER — APPOINTMENT (OUTPATIENT)
Dept: ENDOCRINOLOGY | Facility: CLINIC | Age: 38
End: 2023-07-17
Payer: MEDICAID

## 2023-07-17 VITALS
BODY MASS INDEX: 36 KG/M2 | WEIGHT: 224 LBS | SYSTOLIC BLOOD PRESSURE: 118 MMHG | HEIGHT: 66 IN | OXYGEN SATURATION: 96 % | HEART RATE: 99 BPM | DIASTOLIC BLOOD PRESSURE: 78 MMHG

## 2023-07-17 DIAGNOSIS — E04.2 NONTOXIC MULTINODULAR GOITER: ICD-10-CM

## 2023-07-17 DIAGNOSIS — E89.0 POSTPROCEDURAL HYPOTHYROIDISM: ICD-10-CM

## 2023-07-17 DIAGNOSIS — E66.9 OBESITY, UNSPECIFIED: ICD-10-CM

## 2023-07-17 DIAGNOSIS — E04.1 NONTOXIC SINGLE THYROID NODULE: ICD-10-CM

## 2023-07-17 PROCEDURE — 99214 OFFICE O/P EST MOD 30 MIN: CPT

## 2023-07-18 PROBLEM — E04.2 MULTINODULAR GOITER: Status: RESOLVED | Noted: 2019-06-07 | Resolved: 2023-07-18

## 2023-07-18 PROBLEM — E66.9 OBESITY (BMI 30-39.9): Status: ACTIVE | Noted: 2023-01-09

## 2023-07-18 PROBLEM — E04.1 THYROID NODULE: Status: ACTIVE | Noted: 2023-07-18

## 2023-07-18 NOTE — ASSESSMENT
[Levothyroxine] : The patient was instructed to take Levothyroxine on an empty stomach, separate from vitamins, and wait at least 30 minutes before eating [FreeTextEntry1] : 37 year-old female with \par \par 1. Post-ablative hypothyroidism: on increased LT4 200 mcg.  Clinically and biochemically euthyroid although still complaining of fatigue.  TSH 0.64 I would not recommend changing dose at this time.  Euthyroid and biochemically but complaining of fatigue.  Last TSH mildly elevated.  Check thyroid function tests adjust as needed.\par \par History of a small thyroid nodule.  Current ultrasound shows a small stable left thyroid nodule.  No indication for biopsy at this time.  Observe.\par   \par History of obesity despite previous bariatric surgery.  Options of therapy including GLP-1 therapy discussed in great detail.  Risks and benefits including GI symptoms as well as rare risk of pancreatitis reviewed.  Pt elects to try wegovy.  I will again send it to the pharmacy but patient advised that this may not be covered by insurance.\par  \par Patient has not pursued a freezing as recommended that they consider in the future.

## 2023-07-18 NOTE — HISTORY OF PRESENT ILLNESS
[FreeTextEntry1] : Patient returns for a follow up visit for   post-ablative hypothyroidism..  \par Currently on levothyroxine 200 mcg/day.  Complaining of fatigue.  Recent blood test TSH 0.64 with free T4 1.63.  Prior TSH 12 on levothyroxine 175.\par Thyroid history:\par Pt had been told of an elevated T4 in a previous pregnancy. Repeat labs normal. Labs 12/2015 TSH suppressed, free T4-1 0.9. Pt noticed an increase in heart rate but no other sx of hyperthyroidism. She has no local neck pain. She has had no thyroid  eye disease. No Fhx of thyroid disease. Pt was never exposed to RT, lithium, or amiodarone. H/o GALAN for hyperthyroidism. Initial w/u showed 35% uptake with a hyperfunctional L nodule. Pt was treated with GALAN, 28 mci for 35% uptake, heterogenous, dominant L \par \par Medical hx is notable for morbid obesity treated with a gastric sleeve surgery January 2014. Pt states that it is difficult to lose weight. Pt c/o gaining 40 lbs recently.  Written for weight to be at last visit but this was never filled by the pharmacist.\par \par Of note, previous vit D was low at 16 which is common after bariatric surgery. On Vit D 50k/wk for Vit D 17.\par Had work-up for potential infertility LH FSH normal at 5.8/3.6 prolactin 11.6 testosterone 17  AMH  0.57

## 2023-07-18 NOTE — PROCEDURE
[FreeTextEntry1] : Thyroid ultrasound July 17, 2020 \par single left nodule 8 mm X 8 mm X 12 mm\par \par Thyroid US - 1/9/2023\par Single Left hyperechoic 9 x 8 x 14 mm nodule\par \par Thyroid US - 1/13/21\par Single Left 7 x 6 x 10 mm nodule\par \par Thyroid US - 10/28/2019\par Single Left 7 x 7x 9 mm nodule\par \par Thyroid ultrasound 5/31/16\par Left  13 x 10 x 15 mm lower pole nodule \par Left 8 mm mid/isthmus nodule

## 2023-07-18 NOTE — PHYSICAL EXAM
[Alert] : alert [Well Nourished] : well nourished [No Acute Distress] : no acute distress [Well Developed] : well developed [Normal Sclera/Conjunctiva] : normal sclera/conjunctiva [EOMI] : extra ocular movement intact [No Proptosis] : no proptosis [Normal Oropharynx] : the oropharynx was normal [Thyroid Not Enlarged] : the thyroid was not enlarged [No Thyroid Nodules] : no palpable thyroid nodules [No Respiratory Distress] : no respiratory distress [Clear to Auscultation] : lungs were clear to auscultation bilaterally [Normal S1, S2] : normal S1 and S2 [Normal Rate] : heart rate was normal [Regular Rhythm] : with a regular rhythm [No Edema] : no peripheral edema [Normal Bowel Sounds] : normal bowel sounds [Not Tender] : non-tender [Not Distended] : not distended [Soft] : abdomen soft [Normal Anterior Cervical Nodes] : no anterior cervical lymphadenopathy [No Spinal Tenderness] : no spinal tenderness [Spine Straight] : spine straight [No Stigmata of Cushings Syndrome] : no stigmata of Cushings Syndrome [Normal Gait] : normal gait [No Rash] : no rash [Normal Reflexes] : deep tendon reflexes were 2+ and symmetric [Oriented x3] : oriented to person, place, and time [Acanthosis Nigricans] : no acanthosis nigricans [de-identified] : Resting fine tremor

## 2023-08-29 ENCOUNTER — APPOINTMENT (OUTPATIENT)
Dept: PSYCHIATRY | Facility: CLINIC | Age: 38
End: 2023-08-29

## 2023-10-13 ENCOUNTER — NON-APPOINTMENT (OUTPATIENT)
Age: 38
End: 2023-10-13

## 2023-11-01 ENCOUNTER — APPOINTMENT (OUTPATIENT)
Dept: PSYCHIATRY | Facility: CLINIC | Age: 38
End: 2023-11-01

## 2023-11-17 RX ORDER — SEMAGLUTIDE 0.5 MG/.5ML
0.5 INJECTION, SOLUTION SUBCUTANEOUS
Qty: 1 | Refills: 3 | Status: ACTIVE | COMMUNITY
Start: 2023-01-09 | End: 1900-01-01

## 2024-01-02 NOTE — PAST MEDICAL HISTORY
[Excessive Bleeding] : there was excessive bleeding [Less Bleeding] : the period was lighter than normal [Regular Cycle Intervals] : have been regular Quality 130: Documentation Of Current Medications In The Medical Record: Current Medications Documented Detail Level: Detailed

## 2024-01-10 ENCOUNTER — APPOINTMENT (OUTPATIENT)
Dept: ENDOCRINOLOGY | Facility: CLINIC | Age: 39
End: 2024-01-10

## 2024-02-05 ENCOUNTER — APPOINTMENT (OUTPATIENT)
Dept: ENDOCRINOLOGY | Facility: CLINIC | Age: 39
End: 2024-02-05

## 2024-06-04 ENCOUNTER — APPOINTMENT (OUTPATIENT)
Dept: PSYCHIATRY | Facility: CLINIC | Age: 39
End: 2024-06-04
Payer: MEDICAID

## 2024-06-04 PROCEDURE — 99214 OFFICE O/P EST MOD 30 MIN: CPT

## 2024-06-04 RX ORDER — ESCITALOPRAM OXALATE 10 MG/1
10 TABLET ORAL
Qty: 90 | Refills: 0 | Status: ACTIVE | COMMUNITY
Start: 2020-07-30 | End: 1900-01-01

## 2024-06-04 NOTE — HISTORY OF PRESENT ILLNESS
[de-identified] :  Patient is here in the office for face to face interview with writer for follow-up visit.  Writer has not seen the patient since Feb 2023. States she didn't have any insurance at that time. States in Sept 2023 she tried to titrate off the Lexapro. She was seen to be agitated, irritable, anxiety and depression. Went back on it in Feb 2024 and seems to be doing better on it.   Mood: less depression and anxiety with the help of the Lexapro.  Sleep 7 hours per night Appetite On Ozempic. Lost 30 lbs.  Energy is better but concentration and motivation are both decreased. Denies any AVH, SI or HI.            [No] : no

## 2024-06-04 NOTE — DISCUSSION/SUMMARY
[FreeTextEntry1] : Assessment: Patient is a 38 yo AA female with h/o depression, anxiety, and ADHD  seen today for medication management. Patient is compliant with the medications, tolerating it well without any side effects. I-STOP was checked without any problems.\par  \par  \par  Plan: \par  Start Adderall 5 mg PO QD for ADHD\par  Continue Lexapro to 10  mg QD for depression and anxiety.  \par  - Discussed risks and benefits of medications including side effects of GI and sexual with SSRI. Alternative strategies including no intervention discussed with patient. Patient consents to current medications as prescribed.\par  - Patient understands to contact clinic prn with concerns and agrees to call 911 or go to nearest ER if symptoms worsen.\par  - Next appointment made in 1 month. Patient left the office without any distress.\par

## 2024-06-05 RX ORDER — DEXTROAMPHETAMINE SACCHARATE, AMPHETAMINE ASPARTATE MONOHYDRATE, DEXTROAMPHETAMINE SULFATE AND AMPHETAMINE SULFATE 1.25; 1.25; 1.25; 1.25 MG/1; MG/1; MG/1; MG/1
5 CAPSULE, EXTENDED RELEASE ORAL
Qty: 30 | Refills: 0 | Status: ACTIVE | COMMUNITY
Start: 2023-02-06 | End: 1900-01-01

## 2024-07-05 ENCOUNTER — APPOINTMENT (OUTPATIENT)
Dept: PSYCHIATRY | Facility: CLINIC | Age: 39
End: 2024-07-05

## 2024-07-24 ENCOUNTER — APPOINTMENT (OUTPATIENT)
Dept: PSYCHIATRY | Facility: CLINIC | Age: 39
End: 2024-07-24
Payer: COMMERCIAL

## 2024-07-24 PROCEDURE — 99214 OFFICE O/P EST MOD 30 MIN: CPT

## 2024-07-24 NOTE — DISCUSSION/SUMMARY
[FreeTextEntry1] : Assessment: Patient is a 36 yo AA female with h/o depression, anxiety, and ADHD  seen today for medication management. Patient is compliant with the medications, tolerating it well without any side effects. I-STOP was checked without any problems.   Plan:  Increase Adderall 5 to 10 mg PO QD for ADHD Continue Lexapro to 10  mg QD for depression and anxiety.   - Discussed risks and benefits of medications including side effects of GI and sexual with SSRI. Alternative strategies including no intervention discussed with patient. Patient consents to current medications as prescribed. - Patient understands to contact clinic prn with concerns and agrees to call 911 or go to nearest ER if symptoms worsen. - Next appointment made in 3 month. Patient left the office without any distress.

## 2024-07-24 NOTE — HISTORY OF PRESENT ILLNESS
[de-identified] : Patient is here in the office for face to face interview with writer for 1 month follow-up visit.  Last month Adderall 5 mg was started on the patient. States she lieks it but feels it needs to be increased to 10 mg as 5 mg is too low for her. She loves the Lexapro. "It is my sweet spot."  Mood: less depression and anxiety with the help of the Lexapro.  Sleep 7 hours per night Appetite On Wegovy. Was 228 and now is 192 lbs.  Energy is better but concentration and motivation are both good but could be better. Denies any AVH, SI or HI.            [No] : no

## 2024-11-15 ENCOUNTER — APPOINTMENT (OUTPATIENT)
Dept: PSYCHIATRY | Facility: CLINIC | Age: 39
End: 2024-11-15
Payer: COMMERCIAL

## 2024-11-15 PROCEDURE — 99214 OFFICE O/P EST MOD 30 MIN: CPT | Mod: 95

## 2025-04-21 ENCOUNTER — APPOINTMENT (OUTPATIENT)
Dept: PSYCHIATRY | Facility: CLINIC | Age: 40
End: 2025-04-21
Payer: COMMERCIAL

## 2025-04-21 PROCEDURE — 99214 OFFICE O/P EST MOD 30 MIN: CPT | Mod: 95

## 2025-08-13 ENCOUNTER — APPOINTMENT (OUTPATIENT)
Dept: PSYCHIATRY | Facility: CLINIC | Age: 40
End: 2025-08-13
Payer: COMMERCIAL

## 2025-08-13 PROCEDURE — 99214 OFFICE O/P EST MOD 30 MIN: CPT | Mod: 95

## 2025-08-19 ENCOUNTER — APPOINTMENT (OUTPATIENT)
Dept: ENDOCRINOLOGY | Facility: CLINIC | Age: 40
End: 2025-08-19

## 2025-08-19 VITALS
WEIGHT: 200 LBS | HEART RATE: 88 BPM | SYSTOLIC BLOOD PRESSURE: 122 MMHG | DIASTOLIC BLOOD PRESSURE: 64 MMHG | BODY MASS INDEX: 32.28 KG/M2 | OXYGEN SATURATION: 98 %

## 2025-08-19 DIAGNOSIS — N64.3 GALACTORRHEA NOT ASSOCIATED WITH CHILDBIRTH: ICD-10-CM

## 2025-08-19 DIAGNOSIS — E04.1 NONTOXIC SINGLE THYROID NODULE: ICD-10-CM

## 2025-08-19 DIAGNOSIS — E89.0 POSTPROCEDURAL HYPOTHYROIDISM: ICD-10-CM

## 2025-08-19 PROCEDURE — 99215 OFFICE O/P EST HI 40 MIN: CPT

## 2025-08-19 PROCEDURE — G2211 COMPLEX E/M VISIT ADD ON: CPT

## 2025-08-20 ENCOUNTER — TRANSCRIPTION ENCOUNTER (OUTPATIENT)
Age: 40
End: 2025-08-20

## 2025-08-20 DIAGNOSIS — E22.1 HYPERPROLACTINEMIA: ICD-10-CM

## 2025-08-20 RX ORDER — CABERGOLINE 0.5 MG/1
0.5 TABLET ORAL
Qty: 13 | Refills: 2 | Status: ACTIVE | COMMUNITY
Start: 2025-08-19 | End: 1900-01-01

## 2025-08-21 ENCOUNTER — TRANSCRIPTION ENCOUNTER (OUTPATIENT)
Age: 40
End: 2025-08-21

## 2025-08-21 LAB
ALBUMIN SERPL ELPH-MCNC: 4.1 G/DL
ALP BLD-CCNC: 75 U/L
ALT SERPL-CCNC: 11 U/L
ANION GAP SERPL CALC-SCNC: 14 MMOL/L
AST SERPL-CCNC: 20 U/L
BILIRUB SERPL-MCNC: 0.2 MG/DL
BUN SERPL-MCNC: 11 MG/DL
CALCIUM SERPL-MCNC: 9.2 MG/DL
CHLORIDE SERPL-SCNC: 104 MMOL/L
CO2 SERPL-SCNC: 23 MMOL/L
CREAT SERPL-MCNC: 0.96 MG/DL
EGFRCR SERPLBLD CKD-EPI 2021: 76 ML/MIN/1.73M2
ESTRADIOL SERPL-MCNC: 35 PG/ML
FSH SERPL-MCNC: 10.2 IU/L
GLUCOSE SERPL-MCNC: 79 MG/DL
IGF-1 INTERP: NORMAL
IGF-I BLD-MCNC: 199 NG/ML
LH SERPL-ACNC: 5.5 IU/L
POTASSIUM SERPL-SCNC: 4.6 MMOL/L
PROLACTIN SERPL-MCNC: 8.3 NG/ML
PROT SERPL-MCNC: 6.9 G/DL
SODIUM SERPL-SCNC: 141 MMOL/L
T4 FREE SERPL-MCNC: 1.4 NG/DL
TSH SERPL-ACNC: 3.8 UIU/ML

## 2025-08-29 LAB
MONOMERIC PROLACTIN (ICMA)*: 6.72 NG/ML
PERCENT MACROPROLACTIN: 23 %
PROLACTIN, SERUM (ICMA)*: 8.73 NG/ML